# Patient Record
(demographics unavailable — no encounter records)

---

## 2025-03-06 NOTE — SOCIAL HISTORY
[Apartment] : [unfilled] lives in an apartment  [Radiator/Baseboard] : heating provided by radiator(s)/baseboard(s) [Window Units] : air conditioning provided by window units [Dust Mite Covers] : has dust mite covers [None] : none [Smokers in Household] : there are smokers in the home [Humidifier] : does not use a humidifier [Dehumidifier] : does not use a dehumidifier [Cockroaches] : Patient states that there are no cockroaches in the home [Feather Pillows] : does not have feather pillows [Feather Comforter] : does not have a feather comforter [Bedroom] : not in the bedroom [Basement] : not in the basement [Living Area] : not in the living area [de-identified] : n/a [de-identified] : n/a

## 2025-03-06 NOTE — HISTORY OF PRESENT ILLNESS
[Asthma] : asthma [Allergic Rhinitis] : allergic rhinitis [Eczematous rashes] : eczematous rashes [Venom Reactions] : venom reactions [Food Allergies] : food allergies [Drug Allergies] : drug allergies [de-identified] : RANDALL MARTE  is a 61 year old female who has mold and water leakage in her apartment, since August 2023 she has been experiencing nausea, headaches, nasal congestion, sinus pressure and throat clearing. She says she has spoken to landlord about this issue and had  come to her apartment. She wants to know if she is allergic to mold, she uses over the counter Nasonex, it reliefs itchiness in her eyes and nose. She followed up with PCP, she was referred to an allergist, she says landlorfei and living conditions cause stress every day, she experiences high blood pressure and blurry vision. She is here today to know how to better treat her symptoms. She is going to court tomorrow.